# Patient Record
Sex: FEMALE | Race: ASIAN | Employment: PART TIME | URBAN - METROPOLITAN AREA
[De-identification: names, ages, dates, MRNs, and addresses within clinical notes are randomized per-mention and may not be internally consistent; named-entity substitution may affect disease eponyms.]

---

## 2017-01-25 ENCOUNTER — HOSPITAL ENCOUNTER (OUTPATIENT)
Dept: ULTRASOUND IMAGING | Age: 27
Discharge: HOME | End: 2017-01-25
Attending: FAMILY MEDICINE
Payer: MEDICAID

## 2017-01-25 DIAGNOSIS — Z34.90 SUPERVISION OF NORMAL PREGNANCY: ICD-10-CM

## 2017-01-25 PROCEDURE — 76805 OB US >/= 14 WKS SNGL FETUS: CPT

## 2018-03-27 ENCOUNTER — HOSPITAL ENCOUNTER (OUTPATIENT)
Dept: ULTRASOUND IMAGING | Age: 28
Discharge: HOME | End: 2018-03-27
Attending: FAMILY MEDICINE
Payer: MEDICAID

## 2018-03-27 DIAGNOSIS — Z34.90 PREGNANCY: ICD-10-CM

## 2018-03-27 PROCEDURE — 76805 OB US >/= 14 WKS SNGL FETUS: CPT

## 2019-03-14 ENCOUNTER — OFFICE VISIT (OUTPATIENT)
Dept: INTERNAL MEDICINE CLINIC | Age: 29
End: 2019-03-14

## 2019-03-14 VITALS
SYSTOLIC BLOOD PRESSURE: 132 MMHG | HEART RATE: 80 BPM | RESPIRATION RATE: 16 BRPM | WEIGHT: 158 LBS | HEIGHT: 64 IN | TEMPERATURE: 98.8 F | DIASTOLIC BLOOD PRESSURE: 62 MMHG | BODY MASS INDEX: 26.98 KG/M2

## 2019-03-14 DIAGNOSIS — G51.8 NEURALGIC FACIAL PAIN: Primary | ICD-10-CM

## 2019-03-14 RX ORDER — GABAPENTIN 100 MG/1
100 CAPSULE ORAL 3 TIMES DAILY
Qty: 90 CAP | Refills: 1 | Status: SHIPPED | OUTPATIENT
Start: 2019-03-14

## 2019-03-14 RX ORDER — DICLOFENAC SODIUM 50 MG/1
50 TABLET, DELAYED RELEASE ORAL 2 TIMES DAILY
Qty: 20 TAB | Refills: 0 | Status: SHIPPED | OUTPATIENT
Start: 2019-03-14 | End: 2019-03-24

## 2019-03-14 NOTE — PROGRESS NOTES
Rudene Hodgkins is a 33 y.o. female presenting today for New Patient and Facial Pain  . HPI:  Rudene Hodgkins presents to the office today to establish care with the practice. The patient has no significant past medical history. She presents today complaining of right side neuralgia pain. Patient notes the symptoms started approximately 1 month ago when she thought she had an ear infection. She notes she was seen at a urgent care center and was evaluated for ear infection but did not have one. She also thought the pain was secondary to tooth pain. But she denies any gum irritation or missing teeth. She notes that if the wind blow or if she lays her face on a pillow she has severe neuralgia pain. 2 weeks after the initial start of the neuralgia pain she began to have headaches in the occipital region of her head. She denies any vision loss or blurred vision. She is negative for cough, wheezing or congestion. She denies any chest pain, palpitation or lower extremity edema. Review of Systems   Constitutional: Positive for malaise/fatigue. HENT: Positive for ear pain. Negative for sinus pain. Facial pain   Eyes: Negative for blurred vision and pain. Respiratory: Negative for cough. Cardiovascular: Negative for chest pain, palpitations and leg swelling. Gastrointestinal: Negative for abdominal pain, diarrhea, nausea and vomiting. Musculoskeletal: Negative for myalgias. Neurological: Positive for headaches. No Known Allergies    Current Outpatient Medications   Medication Sig Dispense Refill    gabapentin (NEURONTIN) 100 mg capsule Take 1 Cap by mouth three (3) times daily. 90 Cap 1    diclofenac EC (VOLTAREN) 50 mg EC tablet Take 1 Tab by mouth two (2) times a day for 10 days. 20 Tab 0    HYDROcodone-acetaminophen (NORCO) 5-325 mg per tablet Take 1 tablets PO every 4-6 hours as needed for pain control.   If over the counter ibuprofen or acetaminophen was suggested, then only take the vicodin for pain not well controlled with the over the counter medication. 12 Tab 0       History reviewed. No pertinent past medical history. Past Surgical History:   Procedure Laterality Date    HX CHOLECYSTECTOMY      HX TUBAL LIGATION  2018       Social History     Socioeconomic History    Marital status: UNKNOWN     Spouse name: Not on file    Number of children: Not on file    Years of education: Not on file    Highest education level: Not on file   Social Needs    Financial resource strain: Not on file    Food insecurity - worry: Not on file    Food insecurity - inability: Not on file   [employer] needs - medical: Not on file   [employer] needs - non-medical: Not on file   Occupational History    Not on file   Tobacco Use    Smoking status: Current Every Day Smoker     Packs/day: 0.50    Smokeless tobacco: Never Used   Substance and Sexual Activity    Alcohol use: No    Drug use: No    Sexual activity: Yes     Partners: Male     Birth control/protection: None   Other Topics Concern    Not on file   Social History Narrative    Not on file       Patient does not have an advanced directive on file    Vitals:    03/14/19 1524   BP: 132/62   Pulse: 80   Resp: 16   Temp: 98.8 °F (37.1 °C)   TempSrc: Tympanic   Weight: 158 lb (71.7 kg)   Height: 5' 4\" (1.626 m)   PainSc:   9   PainLoc: Face   LMP: 03/11/2019       Physical Exam   HENT:   Right Ear: External ear normal.   Left Ear: External ear normal.   Right side facial pain and sensitive to touch   Cardiovascular: Normal rate, regular rhythm and normal heart sounds. Pulmonary/Chest: Effort normal and breath sounds normal.   Musculoskeletal: Normal range of motion. Lymphadenopathy:     She has no cervical adenopathy. Neurological: She is alert. Nursing note and vitals reviewed. No visits with results within 3 Month(s) from this visit.    Latest known visit with results is:   Admission on 05/13/2016, Discharged on 05/13/2016   Component Date Value Ref Range Status    Color 05/13/2016 YELLOW    Final    Appearance 05/13/2016 CLEAR    Final    Specific gravity 05/13/2016 1.028  1.005 - 1.030   Final    pH (UA) 05/13/2016 6.0  5.0 - 8.0   Final    Protein 05/13/2016 NEGATIVE   NEG mg/dL Final    Glucose 05/13/2016 NEGATIVE   NEG mg/dL Final    Ketone 05/13/2016 TRACE* NEG mg/dL Final    Bilirubin 05/13/2016 NEGATIVE   NEG   Final    Blood 05/13/2016 NEGATIVE   NEG   Final    Urobilinogen 05/13/2016 1.0  0.2 - 1.0 EU/dL Final    Nitrites 05/13/2016 NEGATIVE   NEG   Final    Leukocyte Esterase 05/13/2016 TRACE* NEG   Final    HCG urine, QL 05/13/2016 NEGATIVE   NEG   Final    Test results should be confirmed using serum quantitative hCG when detection of pregnancy is critical and before performing any critical medical procedure.  WBC 05/13/2016 4 to 10  0 - 4 /hpf Final    RBC 05/13/2016 0 to 3  0 - 5 /hpf Final    Epithelial cells 05/13/2016 3+  0 - 5 /lpf Final    Bacteria 05/13/2016 FEW* NEG /hpf Final    Mucus 05/13/2016 3+* NEG /lpf Final       .No results found for any visits on 03/14/19. Assessment / Plan:      ICD-10-CM ICD-9-CM    1. Neuralgic facial pain G51.8 351.8 REFERRAL TO NEUROLOGY      MRI BRAIN W WO CONT      gabapentin (NEURONTIN) 100 mg capsule      diclofenac EC (VOLTAREN) 50 mg EC tablet      SED RATE (ESR)      C REACTIVE PROTEIN, QT         Neuralgic facial pain-possible Trigeminal neuralgia  MRI Brain ordered  Gabapentin prescription given  Sed Rate and CRP ordered  Diclofenac prescription given- take with food    Follow-up Disposition:  Return if symptoms worsen or fail to improve. I asked the patient if she  had any questions and answered her  questions.   The patient stated that she understands the treatment plan and agrees with the treatment plan

## 2019-03-14 NOTE — LETTER
NOTIFICATION RETURN TO WORK / SCHOOL 
 
3/14/2019 4:30 PM 
 
Ms. Rhoda Phan [facility] [address] [id number] [address] [id number] To Whom It May Concern: 
 
Rhoda Phan is currently under the care of 56 [address]. She will return to work/school on: pending evaluation by neurology If there are questions or concerns please have the patient contact our office.  
 
 
 
Sincerely, 
 
 
 
 
 
Joseph Cuadra NP

## 2019-03-15 LAB
C-REACTIVE PROTEIN, CARDIAC, 120768: 3.01 MG/L
SED RATE (ESR): 46 MM/HR (ref 0–20)

## 2019-03-20 ENCOUNTER — TELEPHONE (OUTPATIENT)
Dept: FAMILY MEDICINE CLINIC | Facility: CLINIC | Age: 29
End: 2019-03-20

## 2019-03-21 NOTE — TELEPHONE ENCOUNTER
Ms Yolandavipul Summers contacted with lab results showing inflammation which is expected. Per Angélica Riggins, ALBA she is to continue voltaren and increase Gabapentin to 200 mg TID PRN and to make sure that she eats with the Voltaren. She expressed understanding.

## 2019-03-26 ENCOUNTER — HOSPITAL ENCOUNTER (OUTPATIENT)
Age: 29
Discharge: HOME | End: 2019-03-26
Attending: NURSE PRACTITIONER
Payer: MEDICAID

## 2019-03-26 DIAGNOSIS — G51.8 NEURALGIC FACIAL PAIN: ICD-10-CM

## 2019-03-26 PROCEDURE — 74011636320 HC RX REV CODE- 636/320: Performed by: NURSE PRACTITIONER

## 2019-03-26 PROCEDURE — 70553 MRI BRAIN STEM W/O & W/DYE: CPT

## 2019-03-26 PROCEDURE — A9575 INJ GADOTERATE MEGLUMI 0.1ML: HCPCS | Performed by: NURSE PRACTITIONER

## 2019-03-26 RX ADMIN — GADOTERATE MEGLUMINE 15 ML: 376.9 INJECTION INTRAVENOUS at 08:00
